# Patient Record
Sex: MALE
[De-identification: names, ages, dates, MRNs, and addresses within clinical notes are randomized per-mention and may not be internally consistent; named-entity substitution may affect disease eponyms.]

---

## 2020-05-20 ENCOUNTER — TRANSCRIBE ORDERS (OUTPATIENT)
Dept: OTHER | Age: 45
End: 2020-05-20

## 2020-05-20 DIAGNOSIS — R74.8 ELEVATED LIPASE: ICD-10-CM

## 2020-05-20 DIAGNOSIS — E87.6 HYPOKALEMIA: ICD-10-CM

## 2020-05-20 DIAGNOSIS — D64.9 NORMOCYTIC ANEMIA: ICD-10-CM

## 2020-05-20 DIAGNOSIS — E87.1 HYPONATREMIA: ICD-10-CM

## 2020-05-20 DIAGNOSIS — K58.2 IRRITABLE BOWEL SYNDROME WITH BOTH CONSTIPATION AND DIARRHEA: Primary | ICD-10-CM

## 2020-05-21 ENCOUNTER — TELEPHONE (OUTPATIENT)
Dept: GASTROENTEROLOGY | Facility: CLINIC | Age: 45
End: 2020-05-21

## 2020-05-21 NOTE — TELEPHONE ENCOUNTER
[facility] Call Center    Phone Message    May a detailed message be left on voicemail: yes     Reason for Call: Other: NP referred to Gastroenterology Consult would like to schedule for Irritable bowel syndrome with both constipation and diarrhea [K58.2]Ref By:Stephanie Loyd from [facility] referring patient to Dr. Drew Please assist      Action Taken: Message routed to: Gastroenterology    Travel Screening: Not Applicable